# Patient Record
Sex: FEMALE | Race: WHITE | NOT HISPANIC OR LATINO | Employment: FULL TIME | URBAN - METROPOLITAN AREA
[De-identification: names, ages, dates, MRNs, and addresses within clinical notes are randomized per-mention and may not be internally consistent; named-entity substitution may affect disease eponyms.]

---

## 2017-08-23 ENCOUNTER — HISTORICAL (OUTPATIENT)
Dept: ADMINISTRATIVE | Facility: HOSPITAL | Age: 42
End: 2017-08-23

## 2019-07-10 ENCOUNTER — HISTORICAL (OUTPATIENT)
Dept: RADIOLOGY | Facility: HOSPITAL | Age: 44
End: 2019-07-10

## 2019-07-22 ENCOUNTER — HISTORICAL (OUTPATIENT)
Dept: ADMINISTRATIVE | Facility: HOSPITAL | Age: 44
End: 2019-07-22

## 2019-09-12 ENCOUNTER — HISTORICAL (OUTPATIENT)
Dept: SURGERY | Facility: HOSPITAL | Age: 44
End: 2019-09-12

## 2021-06-16 ENCOUNTER — HISTORICAL (OUTPATIENT)
Dept: ADMINISTRATIVE | Facility: HOSPITAL | Age: 46
End: 2021-06-16

## 2021-06-16 LAB
APPEARANCE, UA: CLEAR
BACTERIA SPEC CULT: ABNORMAL /HPF
BILIRUB UR QL STRIP: NEGATIVE
COLOR UR: YELLOW
GLUCOSE (UA): NEGATIVE
HGB UR QL STRIP: ABNORMAL
KETONES UR QL STRIP: NEGATIVE
LEUKOCYTE ESTERASE UR QL STRIP: ABNORMAL
NITRITE UR QL STRIP: NEGATIVE
PH UR STRIP: 6 [PH] (ref 5–9)
PROT UR QL STRIP: NEGATIVE
RBC #/AREA URNS HPF: ABNORMAL /[HPF]
SP GR UR STRIP: 1 (ref 1–1.03)
SQUAMOUS EPITHELIAL, UA: ABNORMAL /HPF (ref 0–4)
UROBILINOGEN UR STRIP-ACNC: 0.2
WBC #/AREA URNS HPF: 31 /HPF (ref 0–3)

## 2021-11-11 ENCOUNTER — HISTORICAL (OUTPATIENT)
Dept: ADMINISTRATIVE | Facility: HOSPITAL | Age: 46
End: 2021-11-11

## 2022-04-10 ENCOUNTER — HISTORICAL (OUTPATIENT)
Dept: ADMINISTRATIVE | Facility: HOSPITAL | Age: 47
End: 2022-04-10
Payer: OTHER GOVERNMENT

## 2022-04-25 VITALS
SYSTOLIC BLOOD PRESSURE: 125 MMHG | WEIGHT: 191 LBS | BODY MASS INDEX: 30.7 KG/M2 | DIASTOLIC BLOOD PRESSURE: 74 MMHG | OXYGEN SATURATION: 97 % | HEIGHT: 66 IN

## 2022-04-30 NOTE — OP NOTE
Patient:   Kimi Lockett             MRN: [id number]            FIN: [id number]               Age:   44 years     Sex:  Female     :  1975   Associated Diagnoses:   None   Author:   Kaden DUNN MD, Bob SAHNI      SURGEON: Bob Alfonso MD    PREOPERATIVE DIAGNOSIS: Left shoulder AC arthrosis, impingement  POSTOPERATIVE DIAGNOSIS: Left shoulder AC arthrosis, impingement    PROCEDURE PERFORMED:   Left shoulder arthroscopic distal clavicle excision  Left shoulder arthroscopic subacromial decompression    ASSISTANT: None    ANESTHESIA: General plus regional    ESTIMATED BLOOD LOSS: Minimal.    COMPLICATIONS: None.    IMPLANTS:    None    INDICATIONS FOR PROCEDURE: Kimi is a 44y.o. female who has had ongoing left shoulder pain. The patient was seen in the clinic and preoperative imaging has revealed AC joint arthrosis. The patient has failed nonoperative treatment and has elected for operative intervention. Risks and benefits were thoroughly explained and consent was obtained prior to today's procedure.    DESCRIPTION OF PROCEDURE: The patient was seen in the preoperative holding area where the history and physical were reviewed without change. The operative shoulder was marked, consents were reviewed, and any questions were answered for the patient. A regional blockade of the shoulder was performed by the Anesthesia Service. The patient was induced under general anesthesia. Next the patient was placed upright into the beachchair position with care taken to ensure the cervical spine was well positioned and the face, eyes and all bony prominences well protected. Preoperative time-out led by the surgeon was performed verifying the patient, procedure, and preoperative antibiotics. The left upper extremity was then prepped and draped in the usual sterile fashion and secured to an arm byrne.    A standard posterior portal was established with a #11-blade.  The arthroscope was inserted into the glenohumeral  joint atraumatically. The joint was insufflated with arthroscopic fluid. We then made a standard anterior portal using an #18-gauge spinal needle for guidance. An arthroscopic probe was inserted through the anterior portal and diagnostic arthroscopy begun.    This revealed a intact biceps tendon with a stable anchor. A intact superior labrum. An intact anterior, inferior and posterior labrum. The articular cartilage of the humeral head was intact. The articular cartilage of the glenoid was intact. The subscapularis tendon was intact. The articular side of the supraspinatus tendon was intact. The articular side of the infraspinatus tendon was intact.    We identified the undersurface of the acromion. We used a VAPR to debride the undersurface of the acromion up to the anterior and lateral margins. We identified the CA ligament and reflected it off the acromion. We continued debridement of the bursal tissue.  I then moved medially and anteriorly identified the AC joint.  She had arthrosis here.  I cleaned the remnant of the meniscus and inflammatory tissue around the AC joint.  I then used the bur in order to resect 8 mm off of the distal clavicle.  I was mindful to protect the superior and posterior ligaments.    Next, we did our acromioplasty by burring the anterolateral margin of the acromion then working carefully medially. The portals were switched and the acromioplasty was completed through the posterior portal in the cutting block fashion.  I cleaned the shoulder bony debris.    Once we completed this, we took the remaining final arthroscopic pictures. We then removed our instruments, closed the portals with #3-0 monocryl suture. Sterile dressings were applied. The patient was then placed in an abduction pillow and sling, awoken from general anesthetic, and taken to recovery room in a stable condition.

## 2022-05-23 ENCOUNTER — OFFICE VISIT (OUTPATIENT)
Dept: ORTHOPEDICS | Facility: CLINIC | Age: 47
End: 2022-05-23
Payer: OTHER GOVERNMENT

## 2022-05-23 VITALS
WEIGHT: 207.81 LBS | SYSTOLIC BLOOD PRESSURE: 121 MMHG | DIASTOLIC BLOOD PRESSURE: 86 MMHG | HEART RATE: 79 BPM | BODY MASS INDEX: 33.4 KG/M2 | TEMPERATURE: 98 F | HEIGHT: 66 IN

## 2022-05-23 DIAGNOSIS — D17.23 LIPOMA OF BOTH LOWER EXTREMITIES: Primary | ICD-10-CM

## 2022-05-23 DIAGNOSIS — D17.24 LIPOMA OF BOTH LOWER EXTREMITIES: Primary | ICD-10-CM

## 2022-05-23 PROCEDURE — 99213 OFFICE O/P EST LOW 20 MIN: CPT | Mod: ,,, | Performed by: ORTHOPAEDIC SURGERY

## 2022-05-23 PROCEDURE — 99213 PR OFFICE/OUTPT VISIT, EST, LEVL III, 20-29 MIN: ICD-10-PCS | Mod: ,,, | Performed by: ORTHOPAEDIC SURGERY

## 2022-05-23 RX ORDER — CHOLECALCIFEROL (VITAMIN D3) 25 MCG
1000 TABLET ORAL DAILY
COMMUNITY

## 2022-05-23 NOTE — PROGRESS NOTES
Subjective:    CC: Pain of the Left Ankle, Pain of the Right Ankle, and Follow-up (MRI on MANSOOR Ankles, Patient denies any pain in MANSOOR ankles at this time,)       HPI:  Patient comes in today for her MRI reports of both ankles.  She has been seen by 1 of my partners over 3 months ago.  She did have MRIs at that time.  Her main complaint was bilateral ankle swelling.  Patient had an MRI of both the left and right demonstrating lipomas in both.  She states they continue to bother her they swell at times.  She has tried rest medication without much relief.  She is considering surgery for excision.  She denies any numbness or tingling she denies any masses she denies other complaints.    ROS: Refer to HPI for pertinent ROS. All other 12 point systems negative.    Objective:    Physical Exam:  Bilateral lower extremities compartment soft and warm.  Skin is intact.  There is no signs symptoms of DVT infection.  She does have a cystic-type mobile mass along the anterolateral aspect of both ankles over the ATFL region.  She has a negative anterior drawer she has 45° of ankle motion in both ankles she is stable to stressing she works appropriate gait there is no other masses, there is no skin changes, minimally tender, neurovascular intact distally.    Images: .. Images Reviewed and discussed with patient.    Assessment:  1. Lipoma of both lower extremities        Plan:  At this time we discussed her physical exam and MRI findings.  We discussed at length the pros and cons to conservative treatments well surgical intervention.  She will continue low-impact activities, appropriate shoe wear.  She will call if there is any changes otherwise she will also call if she would like them removed.    Follow UP: Follow up if symptoms worsen or fail to improve.

## 2022-08-08 ENCOUNTER — CLINICAL SUPPORT (OUTPATIENT)
Dept: LAB | Facility: HOSPITAL | Age: 47
End: 2022-08-08
Attending: ORTHOPAEDIC SURGERY
Payer: OTHER GOVERNMENT

## 2022-08-08 ENCOUNTER — OFFICE VISIT (OUTPATIENT)
Dept: ORTHOPEDICS | Facility: CLINIC | Age: 47
End: 2022-08-08
Payer: OTHER GOVERNMENT

## 2022-08-08 VITALS — HEIGHT: 66 IN | WEIGHT: 207 LBS | BODY MASS INDEX: 33.27 KG/M2

## 2022-08-08 DIAGNOSIS — Z01.818 PRE-OP TESTING: ICD-10-CM

## 2022-08-08 DIAGNOSIS — D17.23 LIPOMA OF BOTH LOWER EXTREMITIES: Primary | ICD-10-CM

## 2022-08-08 DIAGNOSIS — D17.23 LIPOMA OF BOTH LOWER EXTREMITIES: ICD-10-CM

## 2022-08-08 DIAGNOSIS — D17.24 LIPOMA OF BOTH LOWER EXTREMITIES: ICD-10-CM

## 2022-08-08 DIAGNOSIS — D17.24 LIPOMA OF BOTH LOWER EXTREMITIES: Primary | ICD-10-CM

## 2022-08-08 PROCEDURE — 99213 PR OFFICE/OUTPT VISIT, EST, LEVL III, 20-29 MIN: ICD-10-PCS | Mod: ,,, | Performed by: ORTHOPAEDIC SURGERY

## 2022-08-08 PROCEDURE — 99213 OFFICE O/P EST LOW 20 MIN: CPT | Mod: ,,, | Performed by: ORTHOPAEDIC SURGERY

## 2022-08-08 PROCEDURE — 93010 ELECTROCARDIOGRAM REPORT: CPT | Mod: ,,, | Performed by: INTERNAL MEDICINE

## 2022-08-08 PROCEDURE — 93010 EKG 12-LEAD: ICD-10-PCS | Mod: ,,, | Performed by: INTERNAL MEDICINE

## 2022-08-08 PROCEDURE — 93005 ELECTROCARDIOGRAM TRACING: CPT

## 2022-08-08 RX ORDER — SODIUM CHLORIDE 9 MG/ML
INJECTION, SOLUTION INTRAVENOUS CONTINUOUS
Status: CANCELLED | OUTPATIENT
Start: 2022-08-08

## 2022-08-08 NOTE — PROGRESS NOTES
Subjective:    CC: Pre-op Exam of the Left Ankle, Pre-op Exam of the Right Ankle, and Pre-op Exam (pre op - bilateral ankle lipoma 8/19/22 - pt states no changes from last visit - td)       HPI:  Patient returns today for repeat exam.  Patient continues to have pain and fullness, swelling in both ankles.  She has had a previous MRI in both ankles.  We have discussed her previous results.  She would like to proceed with surgery.    ROS: Refer to HPI for pertinent ROS. All other 12 point systems negative.    Objective:    Physical Exam:  Extremities compartment soft and warm.  Skin is intact.  There is no signs symptoms of DVT infection.  She does have generalized fullness about the ATFL region, anterolateral aspect of the ankle.  She also has a palpable mass in both ankles in this area.  She does have tenderness.  She is otherwise stable to stressing, she is neurovascular intact distally.    Images: . Images Reviewed and discussed with patient.    Assessment:  1. Lipoma of both lower extremities        Plan:  At this time we discussed her physical exam and previous imaging findings.  We discussed various treatment options.  We have discussed removal, of the lipomas, suspected.  We have also discussed additional treatments.  We have discussed the otherwise bursitis and fullness of her anterolateral ankle.  We have also discussed possible recurrence.  She would like to proceed with surgery.  We will set this up at her convenience.    Follow UP: No follow-ups on file.

## 2022-08-08 NOTE — H&P
Admission History & Physical    Subjective:    CC: Pre-op Exam of the Left Ankle, Pre-op Exam of the Right Ankle, and Pre-op Exam (pre op - bilateral ankle lipoma 8/19/22 - pt states no changes from last visit - td)       HPI:  Kimi Lockett presents today for preoperative evaluation for bilateral ankle lipoma excision. I reviewed the indications for surgery. The risks and benefits of the proposed and alternative treatments were discussed with the patient. Questions pertinent to the procedure were solicited and answered. Dr. Carreon was available to answer any questions with instruction to call clinic with any further questions.No assurances were given. Informed consent was obtained. The patient expressed good understanding and wished to proceed with scheduling the procedure.     ROS:   Constitutional: No fever, weakness, or fatigue.   Ear/Nose/Mouth/Throat: No nasal congestion or sore throat.   Respiratory: No shortness of breath or cough.   Cardiovascular: No chest pain, palpitations, or peripheral edema.   Gastrointestinal: No nausea, vomiting, or abdominal pain.   Genitourinary: No dysuria.  Musculoskeletal:  Bilateral ankle pain, swelling.    Past Surgical History:   Procedure Laterality Date    appendix removal      SHOULDER SURGERY          History reviewed. No pertinent past medical history.     Objective:    There were no vitals filed for this visit.     Physical Exam:    Appearance: No distress, good color on room air. Alert and cooperative.  HEENT: Normocephalic. PERRLA EOM intact.   Lungs: Breathing unlabored.  Heart: Regular rate and rhythm.  Abdomen: Soft, non-tender.  No rebound tenderness.  Extremities: Extremities compartment soft and warm.  Skin is intact.  There is no signs symptoms of DVT infection.  She does have generalized fullness about the ATFL region, anterolateral aspect of the ankle.  She also has a palpable mass in both ankles in this area.  She does have tenderness.  She is  otherwise stable to stressing, she is neurovascular intact distally.  Skin: No rashes or open wounds.        Assessment:  1. Lipoma of both lower extremities  - Full code; Standing  - Place in Outpatient; Standing  - Vital signs; Standing  - Insert peripheral IV; Standing  - Clip and Prep Other (please specifiy) (Operative site); Standing  - Cleanse with Chlorhexidine (CHG); Standing  - Diet NPO; Standing  - 0.9%  NaCl infusion  - IP VTE LOW RISK PATIENT; Standing  - ceFAZolin (ANCEF) 2 g in dextrose 5 % 50 mL IVPB  - Pregnancy, urine rapid; Standing  - CBC auto differential; Future  - Comprehensive metabolic panel; Future  - EKG 12-lead; Future  - Inpatient consult to Anesthesiology; Standing    2. Pre-op testing  - Full code; Standing  - Place in Outpatient; Standing  - Vital signs; Standing  - Insert peripheral IV; Standing  - Clip and Prep Other (please specifiy) (Operative site); Standing  - Cleanse with Chlorhexidine (CHG); Standing  - Diet NPO; Standing  - 0.9%  NaCl infusion  - IP VTE LOW RISK PATIENT; Standing  - ceFAZolin (ANCEF) 2 g in dextrose 5 % 50 mL IVPB  - Pregnancy, urine rapid; Standing  - CBC auto differential; Future  - Comprehensive metabolic panel; Future  - EKG 12-lead; Future  - Inpatient consult to Anesthesiology; Standing       Plan:  Plan for bilateral ankle lipoma excision at [facility]. The patient has been given preoperative instructions and prescriptions for post-operative medication. Post-operative appointment is scheduled for 2 weeks.

## 2022-08-12 ENCOUNTER — TELEPHONE (OUTPATIENT)
Dept: ORTHOPEDICS | Facility: CLINIC | Age: 47
End: 2022-08-12
Payer: OTHER GOVERNMENT

## 2022-08-12 NOTE — TELEPHONE ENCOUNTER
Patient would like to reschedule her surgery from 8/19/22 to 9/16/22 due to work.     Called lianet in surgery to inform her of the change.

## 2022-09-13 ENCOUNTER — ANESTHESIA EVENT (OUTPATIENT)
Dept: SURGERY | Facility: HOSPITAL | Age: 47
End: 2022-09-13
Payer: OTHER GOVERNMENT

## 2022-09-15 ENCOUNTER — TELEPHONE (OUTPATIENT)
Dept: PREADMISSION TESTING | Facility: HOSPITAL | Age: 47
End: 2022-09-15

## 2022-09-15 NOTE — H&P
Admission History & Physical    Subjective:    CC: Pre-op Exam of the Left Ankle, Pre-op Exam of the Right Ankle, and Pre-op Exam (pre op - bilateral ankle lipoma 8/19/22 - pt states no changes from last visit - td)       HPI:  Kimi Lockett presents today for preoperative evaluation for bilateral ankle lipoma excision. I reviewed the indications for surgery. The risks and benefits of the proposed and alternative treatments were discussed with the patient. Questions pertinent to the procedure were solicited and answered. Dr. Carreon was available to answer any questions with instruction to call clinic with any further questions.No assurances were given. Informed consent was obtained. The patient expressed good understanding and wished to proceed with scheduling the procedure.     ROS:   Constitutional: No fever, weakness, or fatigue.   Ear/Nose/Mouth/Throat: No nasal congestion or sore throat.   Respiratory: No shortness of breath or cough.   Cardiovascular: No chest pain, palpitations, or peripheral edema.   Gastrointestinal: No nausea, vomiting, or abdominal pain.   Genitourinary: No dysuria.  Musculoskeletal:  Bilateral ankle pain, swelling.    Past Surgical History:   Procedure Laterality Date    appendix removal      SHOULDER SURGERY          History reviewed. No pertinent past medical history.     Objective:    There were no vitals filed for this visit.     Physical Exam:    Appearance: No distress, good color on room air. Alert and cooperative.  HEENT: Normocephalic. PERRLA EOM intact.   Lungs: Breathing unlabored.  Heart: Regular rate and rhythm.  Abdomen: Soft, non-tender.  No rebound tenderness.  Extremities: Extremities compartment soft and warm.  Skin is intact.  There is no signs symptoms of DVT infection.  She does have generalized fullness about the ATFL region, anterolateral aspect of the ankle.  She also has a palpable mass in both ankles in this area.  She does have tenderness.  She is otherwise  stable to stressing, she is neurovascular intact distally.  Skin: No rashes or open wounds.        Assessment:  1. Lipoma of both lower extremities  - Full code; Standing  - Place in Outpatient; Standing  - Vital signs; Standing  - Insert peripheral IV; Standing  - Clip and Prep Other (please specifiy) (Operative site); Standing  - Cleanse with Chlorhexidine (CHG); Standing  - Diet NPO; Standing  - 0.9%  NaCl infusion  - IP VTE LOW RISK PATIENT; Standing  - ceFAZolin (ANCEF) 2 g in dextrose 5 % 50 mL IVPB  - Pregnancy, urine rapid; Standing  - CBC auto differential; Future  - Comprehensive metabolic panel; Future  - EKG 12-lead; Future  - Inpatient consult to Anesthesiology; Standing    2. Pre-op testing  - Full code; Standing  - Place in Outpatient; Standing  - Vital signs; Standing  - Insert peripheral IV; Standing  - Clip and Prep Other (please specifiy) (Operative site); Standing  - Cleanse with Chlorhexidine (CHG); Standing  - Diet NPO; Standing  - 0.9%  NaCl infusion  - IP VTE LOW RISK PATIENT; Standing  - ceFAZolin (ANCEF) 2 g in dextrose 5 % 50 mL IVPB  - Pregnancy, urine rapid; Standing  - CBC auto differential; Future  - Comprehensive metabolic panel; Future  - EKG 12-lead; Future  - Inpatient consult to Anesthesiology; Standing       Plan:  Plan for bilateral ankle lipoma excision at [facility]. The patient has been given preoperative instructions and prescriptions for post-operative medication. Post-operative appointment is scheduled for 2 weeks.

## 2022-09-16 ENCOUNTER — HOSPITAL ENCOUNTER (OUTPATIENT)
Facility: HOSPITAL | Age: 47
Discharge: HOME | End: 2022-09-16
Attending: ORTHOPAEDIC SURGERY | Admitting: ORTHOPAEDIC SURGERY
Payer: OTHER GOVERNMENT

## 2022-09-16 ENCOUNTER — ANESTHESIA (OUTPATIENT)
Dept: SURGERY | Facility: HOSPITAL | Age: 47
End: 2022-09-16
Payer: OTHER GOVERNMENT

## 2022-09-16 DIAGNOSIS — D17.23 LIPOMA OF BOTH LOWER EXTREMITIES: Primary | ICD-10-CM

## 2022-09-16 DIAGNOSIS — Z01.818 PRE-OP TESTING: ICD-10-CM

## 2022-09-16 DIAGNOSIS — D17.24 LIPOMA OF BOTH LOWER EXTREMITIES: Primary | ICD-10-CM

## 2022-09-16 LAB
B-HCG UR QL: NEGATIVE
CTP QC/QA: YES

## 2022-09-16 PROCEDURE — 71000033 HC RECOVERY, INTIAL HOUR: Performed by: ORTHOPAEDIC SURGERY

## 2022-09-16 PROCEDURE — 28039 EXC FOOT/TOE TUM SC 1.5 CM/>: CPT | Mod: AS,51,RT,

## 2022-09-16 PROCEDURE — 63600175 PHARM REV CODE 636 W HCPCS: Performed by: NURSE ANESTHETIST, CERTIFIED REGISTERED

## 2022-09-16 PROCEDURE — 88304 TISSUE EXAM BY PATHOLOGIST: CPT | Performed by: ORTHOPAEDIC SURGERY

## 2022-09-16 PROCEDURE — 36000706: Performed by: ORTHOPAEDIC SURGERY

## 2022-09-16 PROCEDURE — 81025 URINE PREGNANCY TEST: CPT | Performed by: ORTHOPAEDIC SURGERY

## 2022-09-16 PROCEDURE — 37000009 HC ANESTHESIA EA ADD 15 MINS: Performed by: ORTHOPAEDIC SURGERY

## 2022-09-16 PROCEDURE — 63600175 PHARM REV CODE 636 W HCPCS

## 2022-09-16 PROCEDURE — 37000008 HC ANESTHESIA 1ST 15 MINUTES: Performed by: ORTHOPAEDIC SURGERY

## 2022-09-16 PROCEDURE — 27632 PR EXC TUMOR SOFT TISSUE LEG/ANKLE SUBQ 3+CM: ICD-10-PCS | Mod: 50,,, | Performed by: ORTHOPAEDIC SURGERY

## 2022-09-16 PROCEDURE — 28039 PR EXCISION TUMOR SOFT TISSUE FOOT/TOE SUBQ 1.5+CM: ICD-10-PCS | Mod: AS,51,RT,

## 2022-09-16 PROCEDURE — 27632 EXC LEG/ANKLE LES SC 3 CM/>: CPT | Mod: AS,50,,

## 2022-09-16 PROCEDURE — 27632 EXC LEG/ANKLE LES SC 3 CM/>: CPT | Mod: 50,,, | Performed by: ORTHOPAEDIC SURGERY

## 2022-09-16 PROCEDURE — 71000015 HC POSTOP RECOV 1ST HR: Performed by: ORTHOPAEDIC SURGERY

## 2022-09-16 PROCEDURE — 30000890 SPECIMEN TO PATHOLOGY: Performed by: ORTHOPAEDIC SURGERY

## 2022-09-16 PROCEDURE — 27632 PR EXC TUMOR SOFT TISSUE LEG/ANKLE SUBQ 3+CM: ICD-10-PCS | Mod: AS,50,,

## 2022-09-16 PROCEDURE — 28039 EXC FOOT/TOE TUM SC 1.5 CM/>: CPT | Mod: 51,RT,, | Performed by: ORTHOPAEDIC SURGERY

## 2022-09-16 PROCEDURE — 28039 PR EXCISION TUMOR SOFT TISSUE FOOT/TOE SUBQ 1.5+CM: ICD-10-PCS | Mod: 51,RT,, | Performed by: ORTHOPAEDIC SURGERY

## 2022-09-16 PROCEDURE — 36000707: Performed by: ORTHOPAEDIC SURGERY

## 2022-09-16 PROCEDURE — 71000016 HC POSTOP RECOV ADDL HR: Performed by: ORTHOPAEDIC SURGERY

## 2022-09-16 PROCEDURE — 25000003 PHARM REV CODE 250: Performed by: NURSE ANESTHETIST, CERTIFIED REGISTERED

## 2022-09-16 PROCEDURE — 30000890 HC MISC. SEND OUT TEST: Performed by: ORTHOPAEDIC SURGERY

## 2022-09-16 RX ORDER — SODIUM CHLORIDE, SODIUM GLUCONATE, SODIUM ACETATE, POTASSIUM CHLORIDE AND MAGNESIUM CHLORIDE 30; 37; 368; 526; 502 MG/100ML; MG/100ML; MG/100ML; MG/100ML; MG/100ML
1000 INJECTION, SOLUTION INTRAVENOUS CONTINUOUS
Status: DISCONTINUED | OUTPATIENT
Start: 2022-09-16 | End: 2022-09-16 | Stop reason: HOSPADM

## 2022-09-16 RX ORDER — DEXAMETHASONE SODIUM PHOSPHATE 4 MG/ML
INJECTION, SOLUTION INTRA-ARTICULAR; INTRALESIONAL; INTRAMUSCULAR; INTRAVENOUS; SOFT TISSUE
Status: DISCONTINUED | OUTPATIENT
Start: 2022-09-16 | End: 2022-09-16

## 2022-09-16 RX ORDER — SODIUM CHLORIDE 9 MG/ML
INJECTION, SOLUTION INTRAVENOUS CONTINUOUS
Status: DISCONTINUED | OUTPATIENT
Start: 2022-09-16 | End: 2022-09-16 | Stop reason: HOSPADM

## 2022-09-16 RX ORDER — METHOCARBAMOL 750 MG/1
750 TABLET, FILM COATED ORAL EVERY 6 HOURS PRN
Status: DISCONTINUED | OUTPATIENT
Start: 2022-09-16 | End: 2022-09-16 | Stop reason: HOSPADM

## 2022-09-16 RX ORDER — ONDANSETRON 2 MG/ML
4 INJECTION INTRAMUSCULAR; INTRAVENOUS DAILY PRN
Status: DISCONTINUED | OUTPATIENT
Start: 2022-09-16 | End: 2022-09-16 | Stop reason: HOSPADM

## 2022-09-16 RX ORDER — MIDAZOLAM HYDROCHLORIDE 1 MG/ML
2 INJECTION INTRAMUSCULAR; INTRAVENOUS ONCE AS NEEDED
Status: CANCELLED | OUTPATIENT
Start: 2022-09-16 | End: 2034-02-11

## 2022-09-16 RX ORDER — MAG HYDROX/ALUMINUM HYD/SIMETH 200-200-20
30 SUSPENSION, ORAL (FINAL DOSE FORM) ORAL EVERY 6 HOURS PRN
Status: DISCONTINUED | OUTPATIENT
Start: 2022-09-16 | End: 2022-09-16 | Stop reason: HOSPADM

## 2022-09-16 RX ORDER — LIDOCAINE HYDROCHLORIDE 10 MG/ML
1 INJECTION, SOLUTION EPIDURAL; INFILTRATION; INTRACAUDAL; PERINEURAL ONCE
Status: CANCELLED | OUTPATIENT
Start: 2022-09-16 | End: 2022-09-16

## 2022-09-16 RX ORDER — ONDANSETRON 2 MG/ML
4 INJECTION INTRAMUSCULAR; INTRAVENOUS EVERY 6 HOURS PRN
Status: DISCONTINUED | OUTPATIENT
Start: 2022-09-16 | End: 2022-09-16 | Stop reason: HOSPADM

## 2022-09-16 RX ORDER — CALCIUM CARBONATE 200(500)MG
500 TABLET,CHEWABLE ORAL EVERY 8 HOURS PRN
Status: DISCONTINUED | OUTPATIENT
Start: 2022-09-16 | End: 2022-09-16 | Stop reason: HOSPADM

## 2022-09-16 RX ORDER — ONDANSETRON 4 MG/1
4 TABLET, ORALLY DISINTEGRATING ORAL ONCE
Status: CANCELLED | OUTPATIENT
Start: 2022-09-16 | End: 2022-09-16

## 2022-09-16 RX ORDER — CEFAZOLIN SODIUM 2 G/100ML
2 INJECTION, SOLUTION INTRAVENOUS
Status: DISCONTINUED | OUTPATIENT
Start: 2022-09-16 | End: 2022-09-16 | Stop reason: HOSPADM

## 2022-09-16 RX ORDER — HYDROCODONE BITARTRATE AND ACETAMINOPHEN 5; 325 MG/1; MG/1
1 TABLET ORAL EVERY 4 HOURS PRN
Status: CANCELLED | OUTPATIENT
Start: 2022-09-16

## 2022-09-16 RX ORDER — HYDROMORPHONE HYDROCHLORIDE 2 MG/ML
0.4 INJECTION, SOLUTION INTRAMUSCULAR; INTRAVENOUS; SUBCUTANEOUS EVERY 5 MIN PRN
Status: DISCONTINUED | OUTPATIENT
Start: 2022-09-16 | End: 2022-09-16 | Stop reason: HOSPADM

## 2022-09-16 RX ORDER — SODIUM CHLORIDE, SODIUM GLUCONATE, SODIUM ACETATE, POTASSIUM CHLORIDE AND MAGNESIUM CHLORIDE 30; 37; 368; 526; 502 MG/100ML; MG/100ML; MG/100ML; MG/100ML; MG/100ML
1000 INJECTION, SOLUTION INTRAVENOUS CONTINUOUS
Status: CANCELLED | OUTPATIENT
Start: 2022-09-16 | End: 2022-10-16

## 2022-09-16 RX ORDER — KETOROLAC TROMETHAMINE 30 MG/ML
INJECTION, SOLUTION INTRAMUSCULAR; INTRAVENOUS
Status: DISCONTINUED | OUTPATIENT
Start: 2022-09-16 | End: 2022-09-16

## 2022-09-16 RX ORDER — DIPHENHYDRAMINE HYDROCHLORIDE 50 MG/ML
25 INJECTION INTRAMUSCULAR; INTRAVENOUS ONCE
Status: DISCONTINUED | OUTPATIENT
Start: 2022-09-16 | End: 2022-09-16 | Stop reason: HOSPADM

## 2022-09-16 RX ORDER — LIDOCAINE HYDROCHLORIDE 10 MG/ML
INJECTION, SOLUTION EPIDURAL; INFILTRATION; INTRACAUDAL; PERINEURAL
Status: DISCONTINUED | OUTPATIENT
Start: 2022-09-16 | End: 2022-09-16

## 2022-09-16 RX ORDER — FENTANYL CITRATE 50 UG/ML
INJECTION, SOLUTION INTRAMUSCULAR; INTRAVENOUS
Status: DISCONTINUED | OUTPATIENT
Start: 2022-09-16 | End: 2022-09-16

## 2022-09-16 RX ORDER — HYDROCODONE BITARTRATE AND ACETAMINOPHEN 5; 325 MG/1; MG/1
1 TABLET ORAL EVERY 6 HOURS PRN
Qty: 16 TABLET | Refills: 0 | Status: SHIPPED | OUTPATIENT
Start: 2022-09-16

## 2022-09-16 RX ORDER — MORPHINE SULFATE 4 MG/ML
3 INJECTION, SOLUTION INTRAMUSCULAR; INTRAVENOUS
Status: DISCONTINUED | OUTPATIENT
Start: 2022-09-16 | End: 2022-09-16 | Stop reason: HOSPADM

## 2022-09-16 RX ORDER — ACETAMINOPHEN 325 MG/1
650 TABLET ORAL EVERY 4 HOURS PRN
Status: CANCELLED | OUTPATIENT
Start: 2022-09-16

## 2022-09-16 RX ORDER — METOCLOPRAMIDE HYDROCHLORIDE 5 MG/ML
10 INJECTION INTRAMUSCULAR; INTRAVENOUS EVERY 6 HOURS PRN
Status: DISCONTINUED | OUTPATIENT
Start: 2022-09-16 | End: 2022-09-16 | Stop reason: HOSPADM

## 2022-09-16 RX ORDER — MIDAZOLAM HYDROCHLORIDE 1 MG/ML
INJECTION INTRAMUSCULAR; INTRAVENOUS
Status: DISCONTINUED | OUTPATIENT
Start: 2022-09-16 | End: 2022-09-16

## 2022-09-16 RX ORDER — ACETAMINOPHEN 10 MG/ML
INJECTION, SOLUTION INTRAVENOUS
Status: DISCONTINUED | OUTPATIENT
Start: 2022-09-16 | End: 2022-09-16

## 2022-09-16 RX ORDER — ACETAMINOPHEN 10 MG/ML
INJECTION, SOLUTION INTRAVENOUS
Status: DISCONTINUED
Start: 2022-09-16 | End: 2022-09-16 | Stop reason: HOSPADM

## 2022-09-16 RX ORDER — ONDANSETRON 2 MG/ML
INJECTION INTRAMUSCULAR; INTRAVENOUS
Status: DISCONTINUED | OUTPATIENT
Start: 2022-09-16 | End: 2022-09-16

## 2022-09-16 RX ORDER — PROPOFOL 10 MG/ML
INJECTION, EMULSION INTRAVENOUS
Status: DISCONTINUED | OUTPATIENT
Start: 2022-09-16 | End: 2022-09-16

## 2022-09-16 RX ORDER — HYDROCODONE BITARTRATE AND ACETAMINOPHEN 5; 325 MG/1; MG/1
1 TABLET ORAL EVERY 4 HOURS PRN
Status: DISCONTINUED | OUTPATIENT
Start: 2022-09-16 | End: 2022-09-16 | Stop reason: HOSPADM

## 2022-09-16 RX ORDER — MEPERIDINE HYDROCHLORIDE 25 MG/ML
12.5 INJECTION INTRAMUSCULAR; INTRAVENOUS; SUBCUTANEOUS ONCE
Status: DISCONTINUED | OUTPATIENT
Start: 2022-09-16 | End: 2022-09-16 | Stop reason: HOSPADM

## 2022-09-16 RX ADMIN — MIDAZOLAM 2 MG: 1 INJECTION INTRAMUSCULAR; INTRAVENOUS at 07:09

## 2022-09-16 RX ADMIN — KETOROLAC TROMETHAMINE 30 MG: 30 INJECTION, SOLUTION INTRAMUSCULAR at 08:09

## 2022-09-16 RX ADMIN — LIDOCAINE HYDROCHLORIDE 4 ML: 10 INJECTION, SOLUTION EPIDURAL; INFILTRATION; INTRACAUDAL; PERINEURAL at 07:09

## 2022-09-16 RX ADMIN — SODIUM CHLORIDE, SODIUM GLUCONATE, SODIUM ACETATE, POTASSIUM CHLORIDE AND MAGNESIUM CHLORIDE: 526; 502; 368; 37; 30 INJECTION, SOLUTION INTRAVENOUS at 07:09

## 2022-09-16 RX ADMIN — DEXAMETHASONE SODIUM PHOSPHATE 6 MG: 4 INJECTION, SOLUTION INTRA-ARTICULAR; INTRALESIONAL; INTRAMUSCULAR; INTRAVENOUS; SOFT TISSUE at 07:09

## 2022-09-16 RX ADMIN — ACETAMINOPHEN 1000 MG: 10 INJECTION, SOLUTION INTRAVENOUS at 07:09

## 2022-09-16 RX ADMIN — FENTANYL CITRATE 100 MCG: 50 INJECTION, SOLUTION INTRAMUSCULAR; INTRAVENOUS at 07:09

## 2022-09-16 RX ADMIN — CEFAZOLIN SODIUM 2 G: 2 INJECTION, SOLUTION INTRAVENOUS at 07:09

## 2022-09-16 RX ADMIN — ONDANSETRON HYDROCHLORIDE 4 MG: 2 SOLUTION INTRAMUSCULAR; INTRAVENOUS at 07:09

## 2022-09-16 RX ADMIN — PROPOFOL 200 MG: 10 INJECTION, EMULSION INTRAVENOUS at 07:09

## 2022-09-16 NOTE — ANESTHESIA POSTPROCEDURE EVALUATION
Anesthesia Post Evaluation    Patient: Kimi Lockett    Procedure(s) Performed: Procedure(s) (LRB):  EXCISION, LIPOMA - ankle (Bilateral)    Final Anesthesia Type: general      Patient location during evaluation: PACU  Patient participation: Yes- Able to Participate  Level of consciousness: awake and alert and oriented  Post-procedure vital signs: reviewed and stable  Pain management: adequate  Airway patency: patent  RON mitigation strategies: Verification of full reversal of neuromuscular block  PONV status at discharge: No PONV  Anesthetic complications: no      Cardiovascular status: blood pressure returned to baseline and stable  Respiratory status: spontaneous ventilation and unassisted  Hydration status: euvolemic  Follow-up not needed.  Comments: [facility]          Vitals Value Taken Time   /67 09/16/22 0856   Temp 36.1 °C (97 °F) 09/16/22 0829   Pulse 53 09/16/22 0856   Resp 17 09/16/22 0856   SpO2 100 % 09/16/22 0856   Vitals shown include unvalidated device data.      No case tracking events are documented in the log.      Pain/Alton Score: Alton Score: 10 (9/16/2022  8:42 AM)

## 2022-09-16 NOTE — ANESTHESIA PREPROCEDURE EVALUATION
09/16/2022  Kimi Lockett is a 47 y.o., female.  EXCISION, LIPOMA - ankle (Bilateral)  Pre     Pre-op Assessment    I have reviewed the Patient Summary Reports.     I have reviewed the Nursing Notes. I have reviewed the NPO Status.   I have reviewed the Medications.     Review of Systems  Anesthesia Hx:  No problems with previous Anesthesia    Hematology/Oncology:  Hematology Normal   Oncology Normal     EENT/Dental:EENT/Dental Normal   Cardiovascular:  Cardiovascular Normal Exercise tolerance: good   Functional Capacity good / => 4 METS    Pulmonary:  Pulmonary Normal    Renal/:   Denies Chronic Renal Disease.     Hepatic/GI:  Hepatic/GI Normal    Musculoskeletal:  Musculoskeletal Normal    Neurological:  Neurology Normal    Endocrine:  Endocrine Normal  Denies Morbid Obesity / BMI > 40  Dermatological:  Skin Normal    Psych:  Psychiatric Normal           Physical Exam  General: Alert, Oriented, Well nourished and Cooperative    Airway:  Mallampati: I   Mouth Opening: Normal  TM Distance: Normal  Tongue: Normal  Neck ROM: Normal ROM    Dental:  Intact    Chest/Lungs:  Clear to auscultation, Normal Respiratory Rate    Heart:  Rate: Normal  Rhythm: Regular Rhythm        Anesthesia Plan  Type of Anesthesia, risks & benefits discussed:    Anesthesia Type: Gen ETT, Gen Supraglottic Airway  Intra-op Monitoring Plan: Standard ASA Monitors  Post Op Pain Control Plan: multimodal analgesia  Induction:  IV and Inhalation  Airway Plan: Direct  Informed Consent: Informed consent signed with the Patient and all parties understand the risks and agree with anesthesia plan.  All questions answered. Patient consented to blood products? Yes  ASA Score: 1  Day of Surgery Review of History & Physical: H&P Update referred to the surgeon/provider.    Ready For Surgery From Anesthesia Perspective.     .

## 2022-09-16 NOTE — DISCHARGE INSTRUCTIONS
Please call your DR. If you have problems with pain, bleeding, any signs of infection such as fever 102 or greater, procedure area redness, swelling, drainage, hard or hot to touch or anything of concern. Keep dressing clean, dry and intact for 3 days then remove dressing. You can gently wash area in shower at that time but do not submerge. Apply band-aids and change every other day. No powders, lotions or creams to incisions. Keep extremity elevated to help with pain and swelling. Use ice 20-30 minute periods with 10 minute breaks  for 2 days to help with pain and swelling and that as needed. No driving or legal decisions for 24 hours. Clear liquid diet and advance to regular as tolerated.   Weight bearing as tolerated.

## 2022-09-16 NOTE — OP NOTE
DATE OF PROCEDURE:   09/16/2022    SURGEON:  Demetri Carreon M.D.    ASSISTANT: CARLOS Low     ASSISTANT ATTESTATION:  PA was essential throughout key and critical portions of the case, including soft tissue retraction, implant placement, and wound closure.    HOSPITAL: [facility]     PREOPERATIVE DIAGNOSIS:  Bilateral ankle painful lipoma, right foot lipoma     POSTOPERATIVE DIAGNOSIS:  Same     PROCEDURES PERFORMED:  1. Right ankle lipoma excision approximately 3 x 3 cm 2. Right foot soft tissue mass excision, suspected lipoma, 1 x 2 cm   3. Left ankle excision, lipoma, 3 x 3 cm.     ANESTHESIA: general    IV FLUIDS: Per Anesthesia    ESTIMATED BLOOD LOSS:  10cc     COUNTS:  Correct.    COMPLICATIONS:  None.    IMPLANTS: * No implants in log *    CONDITION: Stable to PACU.        INDICATIONS FOR PROCEDURE:    Kimi Lockett is a 47 y.o. year old female with continued pain swelling loss of motion of both ankles and right foot, patient had retained soft tissue masses.  She did have MRIs demonstrating the above findings.  The the The risks, benefits, and alternatives were discussed with the patient in detail. All questions were answered. Informed consent was obtained.       PROCEDURE IN DETAIL: Patient was found in preoperative holding by Anesthesia, confirmed fit for surgery.  The patient was taken to the operating room, placed on the operating table in supine position.  All prominences were well padded.  Time-out was called, identifying the correct patient, correct procedure, correct site.  All were in agreement.  Patient underwent LMA  anesthesia without complications.  The patient was then prepped and draped in normal sterile fashion leaving the bilateral lower extremities exposed for surgery.  After exsanguination of the right lower extremity tourniquet is inflated.  A 3 cm incisions was made over the anterolateral aspect of the right ankle over the soft tissue mass.  With careful and meticulous dissection  the soft tissue mass, suspected lipoma was debrided off of the anterolateral ankle ligaments.  There is no additional masses appreciated in this region.  It was approximately 3 x 3 cm.  After this was done a separate 2 cm incision was made over the right foot, midfoot, laterally.  Soft tissue dissection down to the fascia where another suspected lipoma was excised.  There is no additional masses appreciated.  Tourniquet was released hemostasis was achieved copious irrigation was used to wash the wound.  Incisions were then closed with 2-0 Vicryl and 3-0 nylon sutures in standard interrupted fashion.  After this was done attention was turned to the left lower extremity  after examination of the left lower extremity turns inflated.  A 3 cm incision was made over the anterolateral aspect, over the soft tissue mass.  With careful meticulous dissection the lipoma, suspected, was removed off of the fascia over the ATFL ligaments.  There is no additional masses appreciated.  Tourniquet was released hemostasis was achieved copious irrigation was used to wash the wound.  This mass was also approximately 3 x 3 cm.  After washout and hemostasis, subcutaneous tissue was closed with 2-0 Vicryl skin was closed with 3-0 nylon sutures in standard interrupted fashion.  Xeroform 4 x 4 soft tissue dressing was placed on the bilateral lower extremities she was then awoken by Anesthesia and brought to the PACU in stable condition.  ______________________________  Demetri Carreon MD

## 2022-09-16 NOTE — ANESTHESIA PROCEDURE NOTES
Intubation    Date/Time: 9/16/2022 7:28 AM  Performed by: Mayur Pineda CRNA  Authorized by: Mayur Pineda CRNA     Intubation:     Induction:  Intravenous    Mask Ventilation:  Easy mask    Attempts:  1    Attempted By:  CRNA    Difficult Airway Encountered?: No      Complications:  None    Airway Device:  Supraglottic airway/LMA    Airway Device Size:  4.0    Style/Cuff Inflation:  Cuffed (inflated to minimal occlusive pressure)    Secured at:  The lips    Placement Verified By:  Capnometry    Complicating Factors:  None    Findings Post-Intubation:  BS equal bilateral

## 2022-09-16 NOTE — TRANSFER OF CARE
"Anesthesia Transfer of Care Note    Patient: Kimi Lockett    Procedure(s) Performed: Procedure(s) (LRB):  EXCISION, LIPOMA - ankle (Bilateral)    Patient location: PACU    Anesthesia Type: general    Transport from OR: Transported from OR on room air with adequate spontaneous ventilation    Post pain: adequate analgesia    Post assessment: no apparent anesthetic complications    Post vital signs: stable    Level of consciousness: awake and sedated    Nausea/Vomiting: no nausea/vomiting    Complications: none    Transfer of care protocol was followed      Last vitals:   Visit Vitals  /71   Pulse 77   Temp 36.2 °C (97.2 °F) (Tympanic)   Resp 20   Ht 5' 6" (1.676 m)   Wt 87.5 kg (192 lb 14.4 oz)   LMP 08/22/2022   SpO2 99%   Breastfeeding No   BMI 31.14 kg/m²     "

## 2022-09-16 NOTE — BRIEF OP NOTE
[facility] - Periop Services  Brief Operative Note    Surgery Date: 9/16/2022     Surgeon(s) and Role:     * Demetri Carreon MD - Primary    Assisting: CARLOS PINEDA  Pre-op Diagnosis:  Lipoma of both lower extremities [D17.23, D17.24]    Post-op Diagnosis:  Post-Op Diagnosis Codes:     * Lipoma of both lower extremities [D17.23, D17.24]    Procedure(s) (LRB):  EXCISION, LIPOMA - ankle (Bilateral)    Anesthesia: General    Operative Findings: see op report    Estimated Blood Loss: <10cc         Specimens:   Specimen (24h ago, onward)       Start     Ordered    09/16/22 0805  Specimen to Pathology  RELEASE UPON ORDERING        References:    Click here for ordering Quick Tip   Question:  Release to patient  Answer:  Immediate    09/16/22 0805                      Discharge Note    OUTCOME: Patient tolerated treatment/procedure well without complication and is now ready for discharge.    DISPOSITION: Home or Self Care    FINAL DIAGNOSIS:  Lipoma of both lower extremities    FOLLOWUP: In clinic    DISCHARGE INSTRUCTIONS:    Discharge Procedure Orders   Diet general   Order Comments: As prior to surgery     Keep surgical extremity elevated     Ice to affected area     No driving, operating heavy equipment or signing legal documents while taking pain medication     Other restrictions (specify):   Order Comments: Weight Bearing: as tolerated  ROM: as tolerated     Remove dressing in 72 hours     Wound care routine (specify)   Order Comments: Wound care routine: keep dressing clean, dry, and intact until POD #3. Ok to remove and shower after. No soaking or topical creams.     Call MD for:  temperature >100.4     Call MD for:  persistent nausea and vomiting     Call MD for:  severe uncontrolled pain     Call MD for:  difficulty breathing, headache or visual disturbances     Call MD for:  redness, tenderness, or signs of infection (pain, swelling, redness, odor or green/yellow discharge around incision site)      Call MD for:  hives     Call MD for:  persistent dizziness or light-headedness     Call MD for:  extreme fatigue     Activity as tolerated     Shower on day dressing removed (No bath)     Weight bearing as tolerated

## 2022-09-19 VITALS
TEMPERATURE: 97 F | RESPIRATION RATE: 20 BRPM | HEIGHT: 66 IN | SYSTOLIC BLOOD PRESSURE: 103 MMHG | OXYGEN SATURATION: 100 % | DIASTOLIC BLOOD PRESSURE: 71 MMHG | WEIGHT: 192.88 LBS | BODY MASS INDEX: 31 KG/M2 | HEART RATE: 59 BPM

## 2022-09-21 LAB — VIEW PATHOLOGY REPORT (RELIAPATH): NORMAL

## 2022-09-29 ENCOUNTER — OFFICE VISIT (OUTPATIENT)
Dept: ORTHOPEDICS | Facility: CLINIC | Age: 47
End: 2022-09-29
Payer: OTHER GOVERNMENT

## 2022-09-29 VITALS
SYSTOLIC BLOOD PRESSURE: 131 MMHG | BODY MASS INDEX: 30.86 KG/M2 | WEIGHT: 192 LBS | HEART RATE: 76 BPM | DIASTOLIC BLOOD PRESSURE: 86 MMHG | HEIGHT: 66 IN

## 2022-09-29 DIAGNOSIS — D17.23 LIPOMA OF BOTH LOWER EXTREMITIES: Primary | ICD-10-CM

## 2022-09-29 DIAGNOSIS — D17.24 LIPOMA OF BOTH LOWER EXTREMITIES: Primary | ICD-10-CM

## 2022-09-29 PROCEDURE — 99024 POSTOP FOLLOW-UP VISIT: CPT | Mod: ,,,

## 2022-09-29 PROCEDURE — 99024 PR POST-OP FOLLOW-UP VISIT: ICD-10-PCS | Mod: ,,,

## 2022-09-29 NOTE — PROGRESS NOTES
Subjective:    CC: Post-op Evaluation of the Right Ankle (Post op bilateral ankle lipoma removal 9/16/22-12/15/22 pt states that she has some swelling and a little irritation ) and Post-op Evaluation of the Left Ankle       HPI:  Patient presents to clinic for 1st postop appointment.  Status post bilateral ankle lipoma excision on 09/16/2022.  Approximately 2 weeks out.  Patient states that she does have some generalized swelling about her left ankle.  She does have some surrounding erythema/ stitch irritation but denies any gross signs of infection.  Denies any drainage, erythema, warmth or tenderness.  Overall improving.  Ambulating fully weight-bearing in flip-flops today.  No new complaints.    ROS: Refer to [facility] for pertinent ROS. All other 12 point systems negative.    Objective:    Vitals:    09/29/22 0924   BP: 131/86   Pulse: 76        Physical Exam:  Bilateral lower extremity compartments are soft and warm.  Skin is intact.  There are no signs symptoms of a DVT or infection.  Her incisions are well healed.  Stitches removed in clinic today.  She does have some slight surrounding erythema suggesting stitch irritation.  No gross signs of infection.  Nontender palpation about her incision sites.  She does have some generalized swelling about the left foot and ankle.  Appropriate range of motion at the ankle bilaterally.   Neurovascularly intact distally.    Images:  Previous Images Reviewed and discussed with patient.    Assessment:  1. Lipoma of both lower extremities       Plan:  Physical exam and intraoperative findings as well as pathology report reviewed with patient.  Wound care instructions given.  Patient is overall doing very well.  Continue weight-bearing as tolerated and OTC anti-inflammatories as needed with appropriate precautions.  We have also discussed elevating and ice for her swelling.  I would like to see the patient back in 4 weeks to assess her progress.    Follow up: Follow up in about 4  weeks (around 10/27/2022).

## 2022-10-18 ENCOUNTER — TELEPHONE (OUTPATIENT)
Dept: ORTHOPEDICS | Facility: CLINIC | Age: 47
End: 2022-10-18
Payer: OTHER GOVERNMENT

## 2022-10-18 NOTE — TELEPHONE ENCOUNTER
Patient called yesterday afternoon to let us know that she has a small amount of drainage from incision with redness.    Spoke with her today she is still having drainage and redness   She will send us pictures.

## 2023-09-05 ENCOUNTER — OFFICE VISIT (OUTPATIENT)
Dept: URGENT CARE | Facility: CLINIC | Age: 48
End: 2023-09-05
Payer: OTHER GOVERNMENT

## 2023-09-05 VITALS
BODY MASS INDEX: 27.97 KG/M2 | DIASTOLIC BLOOD PRESSURE: 82 MMHG | WEIGHT: 174 LBS | HEIGHT: 66 IN | HEART RATE: 84 BPM | TEMPERATURE: 98 F | OXYGEN SATURATION: 98 % | SYSTOLIC BLOOD PRESSURE: 126 MMHG | RESPIRATION RATE: 18 BRPM

## 2023-09-05 DIAGNOSIS — U07.1 COVID-19: ICD-10-CM

## 2023-09-05 DIAGNOSIS — R52 BODY ACHES: Primary | ICD-10-CM

## 2023-09-05 LAB
CTP QC/QA: YES
MOLECULAR STREP A: NEGATIVE
POC MOLECULAR INFLUENZA A AGN: NEGATIVE
POC MOLECULAR INFLUENZA B AGN: NEGATIVE
SARS-COV-2 RDRP RESP QL NAA+PROBE: POSITIVE

## 2023-09-05 PROCEDURE — 87502 INFLUENZA DNA AMP PROBE: CPT | Mod: QW,,, | Performed by: NURSE PRACTITIONER

## 2023-09-05 PROCEDURE — 87502 POCT INFLUENZA A/B MOLECULAR: ICD-10-PCS | Mod: QW,,, | Performed by: NURSE PRACTITIONER

## 2023-09-05 PROCEDURE — 87651 POCT STREP A MOLECULAR: ICD-10-PCS | Mod: QW,,, | Performed by: NURSE PRACTITIONER

## 2023-09-05 PROCEDURE — 87635: ICD-10-PCS | Mod: QW,,, | Performed by: NURSE PRACTITIONER

## 2023-09-05 PROCEDURE — 99204 PR OFFICE/OUTPT VISIT, NEW, LEVL IV, 45-59 MIN: ICD-10-PCS | Mod: ,,, | Performed by: NURSE PRACTITIONER

## 2023-09-05 PROCEDURE — 87651 STREP A DNA AMP PROBE: CPT | Mod: QW,,, | Performed by: NURSE PRACTITIONER

## 2023-09-05 PROCEDURE — 87635 SARS-COV-2 COVID-19 AMP PRB: CPT | Mod: QW,,, | Performed by: NURSE PRACTITIONER

## 2023-09-05 PROCEDURE — 99204 OFFICE O/P NEW MOD 45 MIN: CPT | Mod: ,,, | Performed by: NURSE PRACTITIONER

## 2023-09-05 RX ORDER — NIRMATRELVIR AND RITONAVIR 300-100 MG
KIT ORAL
Qty: 30 TABLET | Refills: 0 | Status: SHIPPED | OUTPATIENT
Start: 2023-09-05 | End: 2023-09-10

## 2023-09-05 NOTE — PROGRESS NOTES
"Subjective:      Patient ID: Kimi Lockett is a 48 y.o. female.    Vitals:  height is 5' 6" (1.676 m) and weight is 78.9 kg (174 lb). Her temperature is 98.4 °F (36.9 °C). Her blood pressure is 126/82 and her pulse is 84. Her respiration is 18 and oxygen saturation is 98%.     Chief Complaint: Generalized Body Aches (Pt presents to clinic with c/o sinus pressure, cough, body aches, sore throat, and nausea starting Sunday. )    48-year-old female presents with sinus congestion, pressure, sore throat, cough, and body aches.  Onset 3 days ago.  No shortness of breath or fever      Constitution: Positive for chills and fatigue.   HENT:  Positive for congestion, sinus pressure and sore throat.    Respiratory:  Positive for cough.       Objective:     Physical Exam   Constitutional: She is oriented to person, place, and time. She appears well-developed. She is cooperative.  Non-toxic appearance. She does not appear ill. No distress.   HENT:   Head: Normocephalic and atraumatic.   Ears:   Right Ear: Hearing, tympanic membrane, external ear and ear canal normal.   Left Ear: Hearing, tympanic membrane, external ear and ear canal normal.   Nose: Nose normal. No mucosal edema, rhinorrhea or nasal deformity. No epistaxis. Right sinus exhibits no maxillary sinus tenderness and no frontal sinus tenderness. Left sinus exhibits no maxillary sinus tenderness and no frontal sinus tenderness.   Mouth/Throat: Uvula is midline, oropharynx is clear and moist and mucous membranes are normal. No trismus in the jaw. Normal dentition. No uvula swelling. No oropharyngeal exudate, posterior oropharyngeal edema or posterior oropharyngeal erythema.   Eyes: Conjunctivae and lids are normal. No scleral icterus.   Neck: Trachea normal and phonation normal. Neck supple. No edema present. No erythema present. No neck rigidity present.   Cardiovascular: Normal rate, regular rhythm, normal heart sounds and normal pulses.   Pulmonary/Chest: Effort " normal and breath sounds normal. No respiratory distress. She has no decreased breath sounds. She has no rhonchi.   Abdominal: Normal appearance.   Musculoskeletal: Normal range of motion.         General: No deformity. Normal range of motion.   Neurological: She is alert and oriented to person, place, and time. She exhibits normal muscle tone. Coordination normal.   Skin: Skin is warm, dry, intact, not diaphoretic and not pale.   Psychiatric: Her speech is normal and behavior is normal. Judgment and thought content normal.   Nursing note and vitals reviewed.      Assessment:     1. Body aches    2. COVID-19      Office Visit on 09/05/2023   Component Date Value Ref Range Status    POC Rapid COVID 09/05/2023 Positive (A)  Negative Final     Acceptable 09/05/2023 Yes   Final    Molecular Strep A, POC 09/05/2023 Negative  Negative Final     Acceptable 09/05/2023 Yes   Final        Plan:   Take Mucinex as directed for cough.  Take prescription Paxlovid as directed    Increase oral fluids, take daily vitamins as directed, Self quarantine for 5 days.    Go to ER for worsening symptoms including shortness of breath, fever, or worsening symptoms.       Body aches  -     POCT COVID-19 Rapid Screening  -     POCT Influenza A/B Molecular  -     POCT Strep A, Molecular    COVID-19  -     nirmatrelvir-ritonavir (PAXLOVID) 300 mg (150 mg x 2)-100 mg copackaged tablets (EUA); Take 3 tablets by mouth 2 (two) times daily. Each dose contains 2 nirmatrelvir (pink tablets) and 1 ritonavir (white tablet). Take all 3 tablets together  Dispense: 30 tablet; Refill: 0

## 2023-09-05 NOTE — LETTER
September 5, 2023      [facility]  [address], [address]  [place] [address]  Phone: [phone]  Fax: [phone]       Patient: Kimi Lockett   YOB: 1975  Date of Visit: 09/05/2023    To Whom It May Concern:    Joyce Lockett  was at [facility] on 09/05/2023. The patient may return to work/school on 09/11/2023 with no restrictions. If you have any questions or concerns, or if I can be of further assistance, please do not hesitate to contact me.    Sincerely,    Franco Fuentes NP

## (undated) DEVICE — BANDAGE ACE NON LATEX 3IN

## (undated) DEVICE — GOWN POLY REINF X-LONG 2XL

## (undated) DEVICE — CUFF ATS 2 PORT SNGL BLDR 18IN

## (undated) DEVICE — GLOVE PROTEXIS BLUE LATEX 8.5

## (undated) DEVICE — SUT BLK MONO 3-0 CUT 18IN F

## (undated) DEVICE — BLADE SURG STAINLESS STEEL #15

## (undated) DEVICE — PADDING CAST SYN STRL 4INX4YD

## (undated) DEVICE — Device

## (undated) DEVICE — SOL NACL IRR 1000ML BTL

## (undated) DEVICE — SUT VICRYL PLUS 2-0 CT1 18

## (undated) DEVICE — DRESSING XEROFORM FOIL PK 1X8

## (undated) DEVICE — GLOVE PROTEXIS LTX MICRO 8

## (undated) DEVICE — APPLICATOR CHLORAPREP ORN 26ML

## (undated) DEVICE — GLOVE 6.5 PROTEXIS PI BLUE

## (undated) DEVICE — GLOVE PROTEXIS HYDROGEL SZ6.5

## (undated) DEVICE — DRAPE HAND STERILE

## (undated) DEVICE — ELECTRODE PATIENT RETURN DISP

## (undated) DEVICE — GAUZE SPONGE 4X4 12PLY

## (undated) DEVICE — GAUZE SPONGE 4X4 12 PLY NS

## (undated) DEVICE — DRESSING XEROFORM 1X8IN

## (undated) DEVICE — DRAPE STERI U-SHAPED 47X51IN

## (undated) DEVICE — BANDAGE VELCLOSE ELAS 4INX5YD